# Patient Record
Sex: FEMALE | Race: BLACK OR AFRICAN AMERICAN | Employment: FULL TIME | URBAN - METROPOLITAN AREA
[De-identification: names, ages, dates, MRNs, and addresses within clinical notes are randomized per-mention and may not be internally consistent; named-entity substitution may affect disease eponyms.]

---

## 2017-04-13 RX ORDER — METFORMIN HYDROCHLORIDE 1000 MG/1
1000 TABLET ORAL 2 TIMES DAILY
Qty: 180 TAB | Refills: 1 | Status: SHIPPED | OUTPATIENT
Start: 2017-04-13 | End: 2017-05-24 | Stop reason: SDUPTHER

## 2017-04-13 RX ORDER — GLIPIZIDE 10 MG/1
10 TABLET ORAL 2 TIMES DAILY
Qty: 180 TAB | Refills: 1 | Status: SHIPPED | OUTPATIENT
Start: 2017-04-13 | End: 2017-05-24 | Stop reason: SDUPTHER

## 2017-05-25 RX ORDER — METFORMIN HYDROCHLORIDE 1000 MG/1
1000 TABLET ORAL 2 TIMES DAILY
Qty: 180 TAB | Refills: 1 | Status: SHIPPED | OUTPATIENT
Start: 2017-05-25 | End: 2017-10-21 | Stop reason: SDUPTHER

## 2017-05-25 RX ORDER — GLIPIZIDE 10 MG/1
10 TABLET ORAL 2 TIMES DAILY
Qty: 180 TAB | Refills: 1 | Status: SHIPPED | OUTPATIENT
Start: 2017-05-25 | End: 2018-04-18

## 2017-06-05 ENCOUNTER — TELEPHONE (OUTPATIENT)
Dept: INTERNAL MEDICINE CLINIC | Age: 57
End: 2017-06-05

## 2017-06-05 NOTE — TELEPHONE ENCOUNTER
Pt is requesting a call back regarding a voicemail received today. Pt best contact [phone].        Message received & copied from [facility]

## 2017-06-06 ENCOUNTER — OFFICE VISIT (OUTPATIENT)
Dept: INTERNAL MEDICINE CLINIC | Age: 57
End: 2017-06-06

## 2017-06-06 VITALS
WEIGHT: 176 LBS | SYSTOLIC BLOOD PRESSURE: 127 MMHG | DIASTOLIC BLOOD PRESSURE: 80 MMHG | HEIGHT: 63 IN | TEMPERATURE: 98.1 F | HEART RATE: 71 BPM | OXYGEN SATURATION: 100 % | BODY MASS INDEX: 31.18 KG/M2 | RESPIRATION RATE: 18 BRPM

## 2017-06-06 DIAGNOSIS — R35.0 URINARY FREQUENCY: ICD-10-CM

## 2017-06-06 LAB
BILIRUB UR QL STRIP: NEGATIVE
GLUCOSE UR-MCNC: NORMAL MG/DL
KETONES P FAST UR STRIP-MCNC: NORMAL MG/DL
PH UR STRIP: 5 [PH] (ref 4.6–8)
PROT UR QL STRIP: NEGATIVE MG/DL
SP GR UR STRIP: 1.02 (ref 1–1.03)
UA UROBILINOGEN AMB POC: NORMAL (ref 0.2–1)
URINALYSIS CLARITY POC: CLEAR
URINALYSIS COLOR POC: NORMAL
URINE BLOOD POC: NEGATIVE
URINE LEUKOCYTES POC: NEGATIVE
URINE NITRITES POC: NEGATIVE

## 2017-06-06 RX ORDER — INSULIN GLARGINE 100 [IU]/ML
INJECTION, SOLUTION SUBCUTANEOUS
Qty: 5 PEN | Refills: 3 | Status: SHIPPED | OUTPATIENT
Start: 2017-06-06 | End: 2017-06-06 | Stop reason: SDUPTHER

## 2017-06-06 NOTE — TELEPHONE ENCOUNTER
Reviewed injection sites with patient while she was in the office. Patient was going to  Insulin pens from the pharmacy and return to the office for instructions on how to give insulin injection. Spoke with patient on the phone. Identified patient 2 identifiers verified. Patient reports prescription was not ready. Patient encouraged to call office or return to office at her convenience if she needed assistance. Dr. Strauss Patch aware.

## 2017-06-06 NOTE — PROGRESS NOTES
SUBJECTIVE:   Ms. Neri Adrian is a 55 y.o. female who is here for f/u of DM. Pt states her blood sugar was 447 yesterday. She claims her blood sugar has been elevated. Pt denies steroid use. Pt reports going to they gym regularly. Pt endorses taking DM medications faithfully. Pt denies known skin lesions. Pt reports her friend told her to ask her doctor about Victoza. Pt notes she cannot do vials and needles, but can do the pin needles for insulin use. Pt reports increased fatigue with elevated blood glucose. Pt states she started a fast where she is not eating meat. Pt reports eating vegetables and rice. Pt states she ate rice Sunday. Pt states she eats bread if she has a sandwich and occasional baked potatoes. Pt reports mostly drinking mostly water and some diet soda. Pt claims she has not noticed changes in vision despite she blood sugar being elevated. Pt reports episodes of increased urination when her blood sugar was 200s. Pt notes this sensation stopped aftert drinking Cranberry juice. Pt reports her urine has changed from yellow to clear. Pt reports calling the office yesterday and was told my nurse would give her a call back. Pt denies having insurance currently. Pt claims she is applying for the Care card, but has not heard back from anyone. Pt notes she uses [facility] pharmacy since [facility]. Pt claims she is waiting for [facility] to open. Pt denies changes in BMs. At this time, she is otherwise doing well and has brought no other complaints to my attention today. For a list of the medical issues addressed today, see the assessment and plan below. PMH:   Past Medical History:   Diagnosis Date    Cutaneous wart left foot 8/2/2011    DM (diabetes mellitus) ([address] 12/10/2009    Hypercholesterolemia     Hyperlipidemia 12/10/2009     PSH:  has a past surgical history that includes gi and gyn. All: has No Known Allergies.    MEDS:   Current Outpatient Prescriptions Medication Sig    insulin glargine (BASAGLAR KWIKPEN) 100 unit/mL (3 mL) inpn Take 20 units subcutaneously every evening. Indications: type 2 diabetes mellitus    metFORMIN (GLUCOPHAGE) 1,000 mg tablet Take 1 Tab by mouth two (2) times a day.  glipiZIDE (GLUCOTROL) 10 mg tablet Take 1 Tab by mouth two (2) times a day.  glucose blood VI test strips (ACCU-CHEK LAURENT PLUS TEST STRP) strip Use as directed to test twice daily- DX-DM-E11.9    Blood-Glucose Meter (ACCU-CHEK LAURENT PLUS METER) misc Use as directed to test twice daily- DX-DM-E11.9    Lancets (ACCU-CHEK SOFTCLIX LANCETS) misc Use as directed to test twice daily- DX-DM-E11.9    nitrofurantoin, macrocrystal-monohydrate, (MACROBID) 100 mg capsule TAKE ONE CAPSULE BY MOUTH TWO TIMES A DAY    terconazole (TERAZOL 7) 0.4 % vaginal cream Insert 1 Applicator into vagina nightly.  LACTOBACILLUS ACIDOPHILUS (PROBIOTIC PO) Take  by mouth.  chlorpheniramine-HYDROcodone (TUSSIONEX) 10-8 mg/5 mL suspension Take 5 mL by mouth every twelve (12) hours as needed for Cough.  LORazepam (ATIVAN) 1 mg tablet Take 1 Tab by mouth every evening.  Blood-Glucose Meter monitoring kit Accu-chek Laurent Meter  Test 2-3 times daily    glucose blood VI test strips (ACCU-CHEK LAURENT) strip Test 2-3 times a day    Lancets Misc Check BG daily    glucose blood VI test strips (BLOOD GLUCOSE TEST) strip Check BG test strips    fluticasone (FLONASE) 50 mcg/Actuation nasal spray by Nasal route. Administer to right and left nostril. No current facility-administered medications for this visit. FH: family history includes Diabetes in her father; MS in her sister. There is no history of Cancer, Heart Disease, Hypertension, or Stroke. SH:  reports that she has quit smoking. She quit after 8.00 years of use. She has never used smokeless tobacco. She reports that she does not drink alcohol or use illicit drugs.      Review of Systems - History obtained from the patient  General ROS: negative  Psychological ROS: negative  Ophthalmic ROS: negative  ENT ROS: negative  Respiratory ROS: no cough, shortness of breath, or wheezing  Cardiovascular ROS: no chest pain or dyspnea on exertion  Gastrointestinal ROS: no abdominal pain, change in bowel habits, or black or bloody stools  Genito-Urinary ROS: negative  Musculoskeletal ROS: negative  Neurological ROS: negative  Dermatological ROS: negative    OBJECTIVE:   Vitals:   Visit Vitals    /80    Pulse 71    Temp 98.1 °F (36.7 °C) (Oral)    Resp 18    Ht 5' 3\" (1.6 m)    Wt 176 lb (79.8 kg)    SpO2 100%    BMI 31.18 kg/m2      Gen: Pleasant 55 y.o.  female in NAD. HEENT: NC/AT. HEART: RRR, No M/G/R. LUNGS: CTAB No W/R. EXTREMITIES: Warm. No C/C/E. NEURO: Alert and oriented x 3. Cranial nerves grossly intact. No focal sensory or motor deficits noted. SKIN: Warm. Dry. No rashes or other lesions noted. ASSESSMENT/ PLAN:   Lindsay Tyler was seen today for diabetes. Diagnoses and all orders for this visit:    Uncontrolled type 2 diabetes mellitus with complication, without long-term current use of insulin (HCC)  -     HEMOGLOBIN A1C WITH EAG  -     insulin glargine (BASAGLAR KWIKPEN) 100 unit/mL (3 mL) inpn; Take 20 units subcutaneously every evening. Indications: type 2 diabetes mellitus    Urinary frequency  -     AMB POC URINALYSIS DIP STICK AUTO W/ MICRO    Other orders  -     Cancel: METABOLIC PANEL, COMPREHENSIVE      Pt was instructed to call ad ask for Cathy when she calls this office again. We discussed that Victoza and insulin are different medications. Eventually diabetic patients have to use insulin. Pt is no longer responding to max dose of oral medications. I ordered an Hgb A1C lab for monitoring of DM. Pt may eat beans and vegetable noodles, but decrease simple carbohydrate intake. I called the pharmacist at [facility] to see the cost of different insulins without insurance.  I prescribed Elizabet Goetz 100 units/mL 20 units every evening for management of DM. She will keep a detailed record of her blood glucose levels. Pt was advised to apply for the [facility] access card so she can see other specialities not covered under the care card. I recommended the pt go to the Care Card office to see about where she is in the application process. Pt was given the forms to apply for the Care Card today since our record did not have her current application. I ordered a UA, that showed glucose and ketones, for investigation of increased urinary frequency. Pt will f/u in one month for DM. Follow-up Disposition:  Return in about 1 month (around 7/6/2017) for follow up diabetes. I have reviewed the patient's medications and risks/side effects/benefits were discussed. Diagnosis(-es) explained to patient and questions answered. Literature provided where appropriate.      Written by Jennifer Balderas, as dictated by Jackson Zafar MD.

## 2017-06-06 NOTE — PROGRESS NOTES
1. Have you been to the ER, urgent care clinic since your last visit? Hospitalized since your last visit? No    2. Have you seen or consulted any other health care providers outside of the [address] [place] [address] since your last visit? Include any pap smears or colon screening.  No     Last eye exam done in March 2017

## 2017-06-06 NOTE — MR AVS SNAPSHOT
Visit Information Date & Time Provider Department Dept. Phone Encounter #  
 6/6/2017 11:00 AM Faye De Oilveira, [address] [id number] Follow-up Instructions Return in about 1 month (around 7/6/2017) for follow up diabetes. Upcoming Health Maintenance Date Due Hepatitis C Screening 1960 Pneumococcal 19-64 Medium Risk (1 of 1 - PPSV23) 7/13/1980 DTaP/Tdap/Td series (1 - Tdap) 7/13/1982 FOBT Q 1 YEAR AGE 50-75 7/13/2011 FOOT EXAM Q1 8/13/2015 HEMOGLOBIN A1C Q6M 1/18/2017 EYE EXAM RETINAL OR DILATED Q1 2/9/2017 MICROALBUMIN Q1 7/18/2017 INFLUENZA AGE 9 TO ADULT 8/1/2017 LIPID PANEL Q1 8/25/2017 BREAST CANCER SCRN MAMMOGRAM 7/22/2018 PAP AKA CERVICAL CYTOLOGY 10/6/2019 Allergies as of 6/6/2017  Review Complete On: 6/6/2017 By: Faye De Oliveira MD  
 No Known Allergies Current Immunizations  Reviewed on 12/28/2010 No immunizations on file. Not reviewed this visit You Were Diagnosed With   
  
 Codes Comments Type 2 diabetes mellitus without complication, without long-term current use of insulin (HCC)    -  Primary ICD-10-CM: E11.9 ICD-9-CM: 250.00 Urinary frequency     ICD-10-CM: R35.0 ICD-9-CM: 788.41 Vitals BP Pulse Temp Resp Height(growth percentile) Weight(growth percentile) 127/80 71 98.1 °F (36.7 °C) (Oral) 18 5' 3\" (1.6 m) 176 lb (79.8 kg) SpO2 BMI OB Status Smoking Status 100% 31.18 kg/m2 Postmenopausal Former Smoker BMI and BSA Data Body Mass Index Body Surface Area  
 31.18 kg/m 2 1.88 m 2 Preferred Pharmacy Pharmacy Name Phone [id number] [address] [phone] Your Updated Medication List  
  
   
This list is accurate as of: 6/6/17 11:50 AM.  Always use your most recent med list.  
  
  
  
  
 * Blood-Glucose Meter monitoring kit Accu-chek Jacquie Meter Test 2-3 times daily * Blood-Glucose Meter Misc Commonly known as:  ACCU-CHEK LAURENT PLUS METER Use as directed to test twice daily- DX-DM-E11.9 chlorpheniramine-HYDROcodone 10-8 mg/5 mL suspension Commonly known as:  Shayla Muscat Take 5 mL by mouth every twelve (12) hours as needed for Cough. fluticasone 50 mcg/actuation nasal spray Commonly known as:  FLONASE  
by Nasal route. Administer to right and left nostril.  
  
 glipiZIDE 10 mg tablet Commonly known as:  Peter Sacramento Take 1 Tab by mouth two (2) times a day. * glucose blood VI test strips strip Commonly known as:  ACCU-CHEK LAURENT Test 2-3 times a day * glucose blood VI test strips strip Commonly known as:  blood glucose test  
Check BG test strips * glucose blood VI test strips strip Commonly known as:  ACCU-CHEK LAURENT PLUS TEST STRP Use as directed to test twice daily- DX-DM-E11.9 * Lancets Misc Check BG daily * Lancets Misc Commonly known as:  ACCU-CHEK SOFTCLIX LANCETS Use as directed to test twice daily- DX-DM-E11.9 LORazepam 1 mg tablet Commonly known as:  ATIVAN Take 1 Tab by mouth every evening. metFORMIN 1,000 mg tablet Commonly known as:  GLUCOPHAGE Take 1 Tab by mouth two (2) times a day. nitrofurantoin (macrocrystal-monohydrate) 100 mg capsule Commonly known as:  MACROBID  
TAKE ONE CAPSULE BY MOUTH TWO TIMES A DAY PROBIOTIC PO Take  by mouth. terconazole 0.4 % vaginal cream  
Commonly known as:  TERAZOL 7 Insert 1 Applicator into vagina nightly. * Notice: This list has 7 medication(s) that are the same as other medications prescribed for you. Read the directions carefully, and ask your doctor or other care provider to review them with you. We Performed the Following AMB POC URINALYSIS DIP STICK AUTO W/ MICRO [08312 CPT(R)] HEMOGLOBIN A1C WITH EAG [90307 CPT(R)] Follow-up Instructions Return in about 1 month (around 7/6/2017) for follow up diabetes. Introducing [facility] & HEALTH SERVICES! Dear Cedric Castle: Thank you for requesting a [email or web address] account. Our records indicate that you already have an active [email or web address] account. You can access your account anytime at [email or web address] Did you know that you can access your hospital and ER discharge instructions at any time in [email or web address]? You can also review all of your test results from your hospital stay or ER visit. Additional Information If you have questions, please visit the Frequently Asked Questions section of the [email or web address] website at [email or web address] Remember, [email or web address] is NOT to be used for urgent needs. For medical emergencies, dial 911. Now available from your iPhone and Android! Please provide this summary of care documentation to your next provider. Your primary care clinician is listed as Keisha Zhou. If you have any questions after today's visit, please call [phone].

## 2017-06-07 DIAGNOSIS — E11.9 TYPE 2 DIABETES MELLITUS WITHOUT COMPLICATION, UNSPECIFIED LONG TERM INSULIN USE STATUS: Primary | ICD-10-CM

## 2017-06-07 LAB
EST. AVERAGE GLUCOSE BLD GHB EST-MCNC: 246 MG/DL
HBA1C MFR BLD: 10.2 % (ref 4.8–5.6)

## 2017-06-07 RX ORDER — INSULIN GLARGINE 100 [IU]/ML
INJECTION, SOLUTION SUBCUTANEOUS
Qty: 5 PEN | Refills: 3 | Status: SHIPPED | OUTPATIENT
Start: 2017-06-07 | End: 2017-09-25 | Stop reason: SDUPTHER

## 2017-06-07 NOTE — TELEPHONE ENCOUNTER
From: Manuel Anglin  To: Joshua Krueger MD  Sent: 6/6/2017 10:37 PM EDT  Subject: Medication Renewal Request    Original authorizing provider: MD Manuel Orlando would like a refill of the following medications:  insulin glargine (BASAGLAR KWIKPEN) 100 unit/mL (3 mL) inpn Joshua Krueger MD]    Preferred pharmacy: [facility] [address] [place] [address] [id number] [facility] [address]    Comment:  I cannot use this medication because it did not include the needles to administer the meds. Please provide a prescription for the smallest needles please. ... Jennifer bowman.

## 2017-06-07 NOTE — TELEPHONE ENCOUNTER
Pt is requesting to speak to Dr. Freya Kenney, about needing a script for needles so she can take her Insulin, which she did not take yesterday because she did not have any needles. The pt would like the script to be sent to the [facility] in [place], pt stated she did not have the number however the pharmacy is in her file, once the script is called in or faxed, please contact the pt as soon as possible because she needs to take her insulin.  The pt best contact number is [phone]       Message received & copied from [facility]

## 2017-09-15 ENCOUNTER — TELEPHONE (OUTPATIENT)
Dept: INTERNAL MEDICINE CLINIC | Age: 57
End: 2017-09-15

## 2017-09-15 NOTE — TELEPHONE ENCOUNTER
Identified patient 2 identifiers verified.  Guillermo Reason too expensive needs something cheeper ordered

## 2017-09-15 NOTE — TELEPHONE ENCOUNTER
#[phone] pt states she received a call from the nurse to schedule an appt. I tried to give her 10-18-17 and she wants to know why she can't be seen until then if you called her?   Please call pt

## 2017-09-19 ENCOUNTER — PATIENT OUTREACH (OUTPATIENT)
Dept: INTERNAL MEDICINE CLINIC | Age: 57
End: 2017-09-19

## 2017-09-19 NOTE — PROGRESS NOTES
Martin Kilpatrick MD 13 hours ago (1:18 AM)              Yolande,  Do you know of any prescription assistance programs? This patient has no insurance and pays out of pocket.           Called pt today and explained the [employer] program. Pt would like the packet of paperwork mailed to her. Pt also inquired about a f/u appt with Dr. Mariah Hobbs, who does not have a routine opening until 10/16/17.  Message routed to Dr. Joel Trivedi LPN , Stacy Lynn asking her to discuss sooner appt with Dr. Mariah Hobbs.

## 2017-09-19 NOTE — TELEPHONE ENCOUNTER
Alex Alonzo you know of any prescription assistance programs? This patient has no insurance and pays out of pocket.

## 2017-09-19 NOTE — Clinical Note
Jacqueline, this pt needs an appt sooner than the first available with Dr. Charo Franklin which is octoer 16th. Please work out a sooner date with Dr. Charo Franklin and call pt to schedule. Thanks.

## 2017-09-22 DIAGNOSIS — E78.2 MIXED HYPERLIPIDEMIA: ICD-10-CM

## 2017-09-22 DIAGNOSIS — Z00.00 ROUTINE GENERAL MEDICAL EXAMINATION AT A HEALTH CARE FACILITY: ICD-10-CM

## 2017-09-22 DIAGNOSIS — E11.9 TYPE 2 DIABETES MELLITUS WITHOUT COMPLICATION, UNSPECIFIED LONG TERM INSULIN USE STATUS: Primary | ICD-10-CM

## 2017-09-22 NOTE — PROGRESS NOTES
Left message for Gali Abdalla, patient advocate for [employer], asking she call pt who was mailed the medication assistance application packet earlier in the week.

## 2017-09-23 LAB
ALBUMIN SERPL-MCNC: 4.7 G/DL (ref 3.5–5.5)
ALBUMIN/GLOB SERPL: 1.7 {RATIO} (ref 1.2–2.2)
ALP SERPL-CCNC: 110 IU/L (ref 39–117)
ALT SERPL-CCNC: 26 IU/L (ref 0–32)
AST SERPL-CCNC: 17 IU/L (ref 0–40)
BILIRUB SERPL-MCNC: 0.3 MG/DL (ref 0–1.2)
BUN SERPL-MCNC: 9 MG/DL (ref 6–24)
BUN/CREAT SERPL: 13 (ref 9–23)
CALCIUM SERPL-MCNC: 9.5 MG/DL (ref 8.7–10.2)
CHLORIDE SERPL-SCNC: 99 MMOL/L (ref 96–106)
CHOLEST SERPL-MCNC: 191 MG/DL (ref 100–199)
CO2 SERPL-SCNC: 26 MMOL/L (ref 18–29)
CREAT SERPL-MCNC: 0.69 MG/DL (ref 0.57–1)
EST. AVERAGE GLUCOSE BLD GHB EST-MCNC: 194 MG/DL
GLOBULIN SER CALC-MCNC: 2.7 G/DL (ref 1.5–4.5)
GLUCOSE SERPL-MCNC: 87 MG/DL (ref 65–99)
HBA1C MFR BLD: 8.4 % (ref 4.8–5.6)
HDLC SERPL-MCNC: 49 MG/DL
LDLC SERPL CALC-MCNC: 108 MG/DL (ref 0–99)
POTASSIUM SERPL-SCNC: 4.1 MMOL/L (ref 3.5–5.2)
PROT SERPL-MCNC: 7.4 G/DL (ref 6–8.5)
SODIUM SERPL-SCNC: 141 MMOL/L (ref 134–144)
TRIGL SERPL-MCNC: 169 MG/DL (ref 0–149)
VLDLC SERPL CALC-MCNC: 34 MG/DL (ref 5–40)

## 2017-09-25 ENCOUNTER — OFFICE VISIT (OUTPATIENT)
Dept: INTERNAL MEDICINE CLINIC | Age: 57
End: 2017-09-25

## 2017-09-25 VITALS
TEMPERATURE: 97.9 F | OXYGEN SATURATION: 99 % | WEIGHT: 186 LBS | HEIGHT: 63 IN | RESPIRATION RATE: 18 BRPM | BODY MASS INDEX: 32.96 KG/M2 | SYSTOLIC BLOOD PRESSURE: 114 MMHG | HEART RATE: 82 BPM | DIASTOLIC BLOOD PRESSURE: 76 MMHG

## 2017-09-25 DIAGNOSIS — B37.9 YEAST INFECTION: ICD-10-CM

## 2017-09-25 RX ORDER — INSULIN GLARGINE 100 [IU]/ML
INJECTION, SOLUTION SUBCUTANEOUS
Qty: 5 PEN | Refills: 3 | Status: SHIPPED | OUTPATIENT
Start: 2017-09-25 | End: 2017-09-25 | Stop reason: SDUPTHER

## 2017-09-25 RX ORDER — INSULIN GLARGINE 100 [IU]/ML
INJECTION, SOLUTION SUBCUTANEOUS
Qty: 1 PEN | Refills: 0 | Status: SHIPPED | COMMUNITY
Start: 2017-09-25

## 2017-09-25 RX ORDER — FLUCONAZOLE 150 MG/1
150 TABLET ORAL DAILY
Qty: 2 TAB | Refills: 2 | Status: SHIPPED | OUTPATIENT
Start: 2017-09-25 | End: 2017-09-26

## 2017-09-25 RX ORDER — INSULIN GLARGINE 100 [IU]/ML
INJECTION, SOLUTION SUBCUTANEOUS
Qty: 5 PEN | Refills: 3 | Status: SHIPPED | OUTPATIENT
Start: 2017-09-25 | End: 2019-01-29

## 2017-09-25 NOTE — MR AVS SNAPSHOT
Visit Information Date & Time Provider Department Dept. Phone Encounter #  
 9/25/2017  3:30 PM Evin Aguila, [address] [phone] [id number] Follow-up Instructions Return in about 3 months (around 12/25/2017) for follow up diabetes. Upcoming Health Maintenance Date Due Hepatitis C Screening 1960 Pneumococcal 19-64 Medium Risk (1 of 1 - PPSV23) 7/13/1980 DTaP/Tdap/Td series (1 - Tdap) 7/13/1982 FOBT Q 1 YEAR AGE 50-75 7/13/2011 FOOT EXAM Q1 8/13/2015 EYE EXAM RETINAL OR DILATED Q1 2/9/2017 MICROALBUMIN Q1 7/18/2017 INFLUENZA AGE 9 TO ADULT 8/1/2017 HEMOGLOBIN A1C Q6M 3/22/2018 BREAST CANCER SCRN MAMMOGRAM 7/22/2018 LIPID PANEL Q1 9/22/2018 PAP AKA CERVICAL CYTOLOGY 10/6/2019 Allergies as of 9/25/2017  Review Complete On: 9/25/2017 By: Faraz Villatoro LPN No Known Allergies Current Immunizations  Reviewed on 12/28/2010 No immunizations on file. Not reviewed this visit You Were Diagnosed With   
  
 Codes Comments Yeast infection     ICD-10-CM: B37.9 ICD-9-CM: 112.9 Uncontrolled type 2 diabetes mellitus with complication, without long-term current use of insulin (HCC)     ICD-10-CM: E11.8, E11.65 ICD-9-CM: 250.82 Vitals BP Pulse Temp Resp Height(growth percentile) Weight(growth percentile) 114/76 (BP 1 Location: Left arm, BP Patient Position: Sitting) 82 97.9 °F (36.6 °C) (Oral) 18 5' 3\" (1.6 m) 186 lb (84.4 kg) SpO2 BMI OB Status Smoking Status 99% 32.95 kg/m2 Postmenopausal Former Smoker BMI and BSA Data Body Mass Index Body Surface Area 32.95 kg/m 2 1.94 m 2 Preferred Pharmacy Pharmacy Name Phone [facility] [address], [facility] [address] [phone] Your Updated Medication List  
  
   
This list is accurate as of: 9/25/17  4:40 PM.  Always use your most recent med list.  
  
  
  
  
 * Blood-Glucose Meter monitoring kit Accu-chek Jacquie Meter Test 2-3 times daily * Blood-Glucose Meter Misc Commonly known as:  ACCU-CHEK JACQUIE PLUS METER Use as directed to test twice daily- DX-DM-E11.9 chlorpheniramine-HYDROcodone 10-8 mg/5 mL suspension Commonly known as:  Florestine Pleitez Take 5 mL by mouth every twelve (12) hours as needed for Cough. fluconazole 150 mg tablet Commonly known as:  DIFLUCAN Take 1 Tab by mouth daily for 1 day. FDA advises cautious prescribing of oral fluconazole in pregnancy. Indications: VULVOVAGINAL CANDIDIASIS  
  
 fluticasone 50 mcg/actuation nasal spray Commonly known as:  FLONASE  
by Nasal route. Administer to right and left nostril.  
  
 glipiZIDE 10 mg tablet Commonly known as:  Clerance Sharps Take 1 Tab by mouth two (2) times a day. * glucose blood VI test strips strip Commonly known as:  ACCU-CHEK JACQUIE Test 2-3 times a day * glucose blood VI test strips strip Commonly known as:  blood glucose test  
Check BG test strips * glucose blood VI test strips strip Commonly known as:  ACCU-CHEK JACQUIE PLUS TEST STRP Use as directed to test twice daily- DX-DM-E11.9  
  
 insulin glargine 100 unit/mL (3 mL) Inpn Commonly known asMikel Lourdes Take 20 units subcutaneously every evening. Indications: type 2 diabetes mellitus * Lancets Misc Check BG daily * Lancets Misc Commonly known as:  ACCU-CHEK SOFTCLIX LANCETS Use as directed to test twice daily- DX-DM-E11.9 LORazepam 1 mg tablet Commonly known as:  ATIVAN Take 1 Tab by mouth every evening. metFORMIN 1,000 mg tablet Commonly known as:  GLUCOPHAGE Take 1 Tab by mouth two (2) times a day. nitrofurantoin (macrocrystal-monohydrate) 100 mg capsule Commonly known as:  MACROBID  
TAKE ONE CAPSULE BY MOUTH TWO TIMES A DAY PROBIOTIC PO Take  by mouth.   
  
 terconazole 0.4 % vaginal cream  
 Commonly known as:  TERAZOL 7 Insert 1 Applicator into vagina nightly. * Notice: This list has 7 medication(s) that are the same as other medications prescribed for you. Read the directions carefully, and ask your doctor or other care provider to review them with you. Prescriptions Printed Refills  
 fluconazole (DIFLUCAN) 150 mg tablet 2 Sig: Take 1 Tab by mouth daily for 1 day. FDA advises cautious prescribing of oral fluconazole in pregnancy. Indications: VULVOVAGINAL CANDIDIASIS Class: Print Route: Oral  
  
Prescriptions Sent to Pharmacy Refills  
 insulin glargine (BASAGLAR KWIKPEN) 100 unit/mL (3 mL) inpn 3 Sig: Take 20 units subcutaneously every evening. Indications: type 2 diabetes mellitus Class: Normal  
 Pharmacy: [address] Uribe, [address] [place] [address] #: [phone] We Performed the Following REFERRAL TO ENDOCRINOLOGY [XZQ50 Custom] Follow-up Instructions Return in about 3 months (around 12/25/2017) for follow up diabetes. Referral Information Referral ID Referred By Referred To  
  
 [id number] PHILLIP, [address] Mercy Rhodell, MD   
   [address] [place] [address] [address] [place] [address] Phone: [phone] Fax: [phone] Visits Status Start Date End Date 1 New Request 9/25/17 9/25/18 If your referral has a status of pending review or denied, additional information will be sent to support the outcome of this decision. Introducing [facility] & HEALTH SERVICES! Dear Dayana Ta: Thank you for requesting a [employer] account. Our records indicate that you already have an active [employer] account. You can access your account anytime at [email or web address] Did you know that you can access your hospital and ER discharge instructions at any time in [employer]? You can also review all of your test results from your hospital stay or ER visit. Additional Information If you have questions, please visit the Frequently Asked Questions section of the [employer] website at [email or web address] Remember, [email or web address] / Netcycler is NOT to be used for urgent needs. For medical emergencies, dial 911. Now available from your iPhone and Android! Please provide this summary of care documentation to your next provider. Your primary care clinician is listed as Guilherme Worrell. If you have any questions after today's visit, please call [phone].

## 2017-09-25 NOTE — PROGRESS NOTES
SUBJECTIVE:   Ms. Raúl Milner is a 65 y.o. female who is here for follow up of DM. Pt's A1c was 8.4 on 9/22/2017, which was decreased from 10.2 on 6/6/2017. Pt reports use of 30 units of Basaglar. Pt states Basaglar was $15 per box with coupons for the first 2 boxes, but now costs $200 per box. Pt's labs from 9/22 were reviewed today. Pt's lipid panel showed elevated triglycerides at 169 and elevated LDL at 108. Pt's CMP was normal.     Pt c/o muscular soreness of her chest after leaning over sitting on the side of her bed for extended periods of time. Pt endorses wearing a bra. Pt reports occasional BLE swelling. At this time, she is otherwise doing well and has brought no other complaints to my attention today. For a list of the medical issues addressed today, see the assessment and plan below. PMH:   Past Medical History:   Diagnosis Date    Cutaneous wart left foot 8/2/2011    DM (diabetes mellitus) ([facility] [address] 12/10/2009    Hypercholesterolemia     Hyperlipidemia 12/10/2009     PSH:  has a past surgical history that includes gi and gyn. All: has No Known Allergies. MEDS:   Current Outpatient Prescriptions   Medication Sig    fluconazole (DIFLUCAN) 150 mg tablet Take 1 Tab by mouth daily for 1 day. FDA advises cautious prescribing of oral fluconazole in pregnancy. Indications: VULVOVAGINAL CANDIDIASIS    insulin glargine (BASAGLAR KWIKPEN) 100 unit/mL (3 mL) inpn Take 20 units subcutaneously every evening. Indications: type 2 diabetes mellitus    insulin glargine (LANTUS,BASAGLAR) 100 unit/mL (3 mL) inpn Take 30 units subcutaneously every evening.  metFORMIN (GLUCOPHAGE) 1,000 mg tablet Take 1 Tab by mouth two (2) times a day.  glipiZIDE (GLUCOTROL) 10 mg tablet Take 1 Tab by mouth two (2) times a day.     glucose blood VI test strips (ACCU-CHEK LAURENT PLUS TEST STRP) strip Use as directed to test twice daily- DX-DM-E11.9    Blood-Glucose Meter (ACCU-CHEK LAURENT PLUS METER) misc Use as directed to test twice daily- DX-DM-E11.9    Lancets (ACCU-CHEK SOFTCLIX LANCETS) misc Use as directed to test twice daily- DX-DM-E11.9    nitrofurantoin, macrocrystal-monohydrate, (MACROBID) 100 mg capsule TAKE ONE CAPSULE BY MOUTH TWO TIMES A DAY    terconazole (TERAZOL 7) 0.4 % vaginal cream Insert 1 Applicator into vagina nightly.  LACTOBACILLUS ACIDOPHILUS (PROBIOTIC PO) Take  by mouth.  chlorpheniramine-HYDROcodone (TUSSIONEX) 10-8 mg/5 mL suspension Take 5 mL by mouth every twelve (12) hours as needed for Cough.  LORazepam (ATIVAN) 1 mg tablet Take 1 Tab by mouth every evening.  Blood-Glucose Meter monitoring kit Accu-chek Jacquie Meter  Test 2-3 times daily    glucose blood VI test strips (ACCU-CHEK JACQUIE) strip Test 2-3 times a day    Lancets Misc Check BG daily    glucose blood VI test strips (BLOOD GLUCOSE TEST) strip Check BG test strips    fluticasone (FLONASE) 50 mcg/Actuation nasal spray by Nasal route. Administer to right and left nostril. No current facility-administered medications for this visit. FH: family history includes Diabetes in her father; MS in her sister. There is no history of Cancer, Heart Disease, Hypertension, or Stroke. SH:  reports that she has quit smoking. She quit after 8.00 years of use. She has never used smokeless tobacco. She reports that she does not drink alcohol or use illicit drugs.      Review of Systems - History obtained from the patient  General ROS: +BLE swelling, otherwise negative  Psychological ROS: negative  Ophthalmic ROS: negative  ENT ROS: negative  Respiratory ROS: no cough, shortness of breath, or wheezing  Cardiovascular ROS: no chest pain or dyspnea on exertion  Gastrointestinal ROS: no abdominal pain, change in bowel habits, or black or bloody stools  Genito-Urinary ROS: negative  Musculoskeletal ROS: negative  Neurological ROS: negative  Dermatological ROS: negative    OBJECTIVE:   Vitals:   Visit Vitals    BP 114/76 (BP 1 Location: Left arm, BP Patient Position: Sitting)    Pulse 82    Temp 97.9 °F (36.6 °C) (Oral)    Resp 18    Ht 5' 3\" (1.6 m)    Wt 186 lb (84.4 kg)    SpO2 99%    BMI 32.95 kg/m2      Gen: Pleasant 65 y.o.  female in NAD. HEENT: NC/AT. HEART: RRR, No M/G/R. LUNGS: CTAB No W/R. EXTREMITIES: Warm. No C/C/E. NEURO: Alert and oriented x 3. Cranial nerves grossly intact. No focal sensory or motor deficits noted. SKIN: Warm. Dry. No rashes or other lesions noted. ASSESSMENT/ PLAN:     Diagnoses and all orders for this visit:    1. Yeast infection  -     fluconazole (DIFLUCAN) 150 mg tablet; Take 1 Tab by mouth daily for 1 day. FDA advises cautious prescribing of oral fluconazole in pregnancy. Indications: VULVOVAGINAL CANDIDIASIS    2. Uncontrolled type 2 diabetes mellitus with complication, without long-term current use of insulin (HCC)  -     insulin glargine (BASAGLAR KWIKPEN) 100 unit/mL (3 mL) inpn; Take 20 units subcutaneously every evening. Indications: type 2 diabetes mellitus  -     REFERRAL TO ENDOCRINOLOGY    Other orders  -     insulin glargine (LANTUS,BASAGLAR) 100 unit/mL (3 mL) inpn; Take 30 units subcutaneously every evening. I prescribed Diflucan 150mg 1 tablet x 1 day for management of yeast infection. All lab results were reviewed and there has been improvement in the A1c. I refilled Lamar Posadas and pt was referred to Dr. Bonnie Murray (DM) for continued management of DM. Pt was given a Basaglar savings card and a sample of Lantus Basaglar today. Pt was advised to reduce portion sizes, limit simple carbohydrate intake, and exercise regularly. Pt was advised that she may use light compression hose for management of BLE swelling. Pt will f/u in 3 months for DM. Follow-up Disposition:  Return in about 3 months (around 12/25/2017) for follow up diabetes. I have reviewed the patient's medications and risks/side effects/benefits were discussed. Diagnosis(-es) explained to patient and questions answered. Literature provided where appropriate.      Written by Alison Zuniga, as dictated by Jose Manuel Skinner MD.

## 2017-10-08 NOTE — PROGRESS NOTES
CMP-Normal electrolyte levels, renal, and liver function. Lipids-Your current lab results reveal a elevated triglyceride, and ldl. Your total cholesterol should be under 200, triglycerides under 150, and your ldl under 100. Work on following a low fat diet and exercise at least three times a week. A1c-Your current hgbA1c is lower than your last level of 10.2  Keep up the good work! Continue to work on following a diabetic diet and exercise. Recheck this test: hgbA1c  in  3 months. Continue with current  medications.

## 2017-10-20 ENCOUNTER — TELEPHONE (OUTPATIENT)
Dept: INTERNAL MEDICINE CLINIC | Age: 57
End: 2017-10-20

## 2017-10-20 DIAGNOSIS — J20.9 ACUTE BRONCHITIS, UNSPECIFIED ORGANISM: ICD-10-CM

## 2017-10-20 RX ORDER — HYDROCODONE POLISTIREX AND CHLORPHENIRAMINE POLISTIREX 10; 8 MG/5ML; MG/5ML
5 SUSPENSION, EXTENDED RELEASE ORAL
Qty: 115 ML | Refills: 0 | Status: SHIPPED | OUTPATIENT
Start: 2017-10-20

## 2017-10-20 RX ORDER — AZITHROMYCIN 250 MG/1
TABLET, FILM COATED ORAL SEE ADMIN INSTRUCTIONS
Qty: 6 TAB | Refills: 0 | Status: CANCELLED | OUTPATIENT
Start: 2017-10-20 | End: 2017-10-25

## 2017-10-20 NOTE — TELEPHONE ENCOUNTER
Identified patient 2 identifiers verified. Patient  Reports coughing up brown secretions , no temperature, chest hurts when she coughs requesting Tussinex patient reports this happens every year.    Last office visit  9/25/17

## 2017-10-22 RX ORDER — METFORMIN HYDROCHLORIDE 1000 MG/1
TABLET ORAL
Qty: 180 TAB | Refills: 1 | Status: SHIPPED | OUTPATIENT
Start: 2017-10-22 | End: 2018-06-06 | Stop reason: SDUPTHER

## 2017-10-23 ENCOUNTER — OFFICE VISIT (OUTPATIENT)
Dept: INTERNAL MEDICINE CLINIC | Age: 57
End: 2017-10-23

## 2017-10-23 VITALS
HEIGHT: 63 IN | WEIGHT: 182.4 LBS | SYSTOLIC BLOOD PRESSURE: 139 MMHG | RESPIRATION RATE: 18 BRPM | DIASTOLIC BLOOD PRESSURE: 80 MMHG | HEART RATE: 82 BPM | BODY MASS INDEX: 32.32 KG/M2 | TEMPERATURE: 98.1 F | OXYGEN SATURATION: 99 %

## 2017-10-23 DIAGNOSIS — J20.9 ACUTE BRONCHITIS, UNSPECIFIED ORGANISM: Primary | ICD-10-CM

## 2017-10-23 RX ORDER — AZITHROMYCIN 250 MG/1
250 TABLET, FILM COATED ORAL SEE ADMIN INSTRUCTIONS
Qty: 6 TAB | Refills: 0 | Status: SHIPPED | OUTPATIENT
Start: 2017-10-23 | End: 2019-01-29

## 2017-10-23 NOTE — PATIENT INSTRUCTIONS
Bronchitis: Care Instructions  Your Care Instructions    Bronchitis is inflammation of the bronchial tubes, which carry air to the lungs. The tubes swell and produce mucus, or phlegm. The mucus and inflamed bronchial tubes make you cough. You may have trouble breathing. Most cases of bronchitis are caused by viruses like those that cause colds. Antibiotics usually do not help and they may be harmful. Bronchitis usually develops rapidly and lasts about 2 to 3 weeks in otherwise healthy people. Follow-up care is a key part of your treatment and safety. Be sure to make and go to all appointments, and call your doctor if you are having problems. It's also a good idea to know your test results and keep a list of the medicines you take. How can you care for yourself at home? · Take all medicines exactly as prescribed. Call your doctor if you think you are having a problem with your medicine. · Get some extra rest.  · Take an over-the-counter pain medicine, such as acetaminophen (Tylenol), ibuprofen (Advil, Motrin), or naproxen (Aleve) to reduce fever and relieve body aches. Read and follow all instructions on the label. · Do not take two or more pain medicines at the same time unless the doctor told you to. Many pain medicines have acetaminophen, which is Tylenol. Too much acetaminophen (Tylenol) can be harmful. · Take an over-the-counter cough medicine that contains dextromethorphan to help quiet a dry, hacking cough so that you can sleep. Avoid cough medicines that have more than one active ingredient. Read and follow all instructions on the label. · Breathe moist air from a humidifier, hot shower, or sink filled with hot water. The heat and moisture will thin mucus so you can cough it out. · Do not smoke. Smoking can make bronchitis worse. If you need help quitting, talk to your doctor about stop-smoking programs and medicines. These can increase your chances of quitting for good.   When should you call for help? Call 911 anytime you think you may need emergency care. For example, call if:  · You have severe trouble breathing. Call your doctor now or seek immediate medical care if:  · You have new or worse trouble breathing. · You cough up dark brown or bloody mucus (sputum). · You have a new or higher fever. · You have a new rash. Watch closely for changes in your health, and be sure to contact your doctor if:  · You cough more deeply or more often, especially if you notice more mucus or a change in the color of your mucus. · You are not getting better as expected. Where can you learn more? Go to http://florence-tashi.info/. Enter H333 in the search box to learn more about \"Bronchitis: Care Instructions. \"  Current as of: March 25, 2017  Content Version: 11.3  © 6495-7457 [employer]. Care instructions adapted under license by [employer] (which disclaims liability or warranty for this information). If you have questions about a medical condition or this instruction, always ask your healthcare professional. [address] any warranty or liability for your use of this information.

## 2017-10-23 NOTE — PROGRESS NOTES
Chief Complaint   Patient presents with    Cough     1. Have you been to the ER, urgent care clinic since your last visit? Hospitalized since your last visit? No    2. Have you seen or consulted any other health care providers outside of the [address] [place] [address] since your last visit? Include any pap smears or colon screening.  No

## 2017-10-23 NOTE — MR AVS SNAPSHOT
Visit Information Date & Time Provider Department Dept. Phone Encounter #  
 10/23/2017  2:15 PM Jose Franklin [facility] [address] [phone] [id number] Follow-up Instructions Return if symptoms worsen or fail to improve. Your Appointments 12/27/2017  2:45 PM  
ROUTINE CARE with MD YUNIOR Godoy [facility]) Appt Note: 3 month follow up  
 [facility] [address] [phone]  
[address] [facility] [address] [place] [address] Upcoming Health Maintenance Date Due Hepatitis C Screening 1960 Pneumococcal 19-64 Medium Risk (1 of 1 - PPSV23) 7/13/1980 DTaP/Tdap/Td series (1 - Tdap) 7/13/1982 FOBT Q 1 YEAR AGE 50-75 7/13/2011 FOOT EXAM Q1 8/13/2015 EYE EXAM RETINAL OR DILATED Q1 2/9/2017 MICROALBUMIN Q1 7/18/2017 INFLUENZA AGE 9 TO ADULT 8/1/2017 HEMOGLOBIN A1C Q6M 3/22/2018 BREAST CANCER SCRN MAMMOGRAM 7/22/2018 LIPID PANEL Q1 9/22/2018 PAP AKA CERVICAL CYTOLOGY 10/6/2019 Allergies as of 10/23/2017  Review Complete On: 10/23/2017 By: Jesse Knott No Known Allergies Current Immunizations  Reviewed on 12/28/2010 No immunizations on file. Not reviewed this visit You Were Diagnosed With   
  
 Codes Comments Acute bronchitis, unspecified organism    -  Primary ICD-10-CM: J20.9 ICD-9-CM: 466.0 Vitals BP Pulse Temp Resp Height(growth percentile) Weight(growth percentile) 139/80 (BP 1 Location: Right arm, BP Patient Position: Sitting) 82 98.1 °F (36.7 °C) (Oral) 18 5' 3\" (1.6 m) 182 lb 6.4 oz (82.7 kg) SpO2 BMI OB Status Smoking Status 99% 32.31 kg/m2 Postmenopausal Former Smoker BMI and BSA Data Body Mass Index Body Surface Area  
 32.31 kg/m 2 1.92 m 2 Preferred Pharmacy Pharmacy Name Phone  [id number] - [address] [phone] Your Updated Medication List  
  
   
This list is accurate as of: 10/23/17  2:30 PM.  Always use your most recent med list.  
  
  
  
  
 azithromycin 250 mg tablet Commonly known as:  [place] Sleight Take 1 Tab by mouth See Admin Instructions for 6 doses. Take 2 tabs on day one followed by 1 tab daily for a total of 5 days. * Blood-Glucose Meter monitoring kit Accu-chek Jacquie Meter Test 2-3 times daily * Blood-Glucose Meter Misc Commonly known as:  ACCU-CHEK JACQUIE PLUS METER Use as directed to test twice daily- DX-DM-E11.9 chlorpheniramine-HYDROcodone 10-8 mg/5 mL suspension Commonly known as:  Wynema Camara Take 5 mL by mouth every twelve (12) hours as needed for Cough. fluticasone 50 mcg/actuation nasal spray Commonly known as:  FLONASE  
by Nasal route. Administer to right and left nostril.  
  
 glipiZIDE 10 mg tablet Commonly known as:  Olevia Handy Take 1 Tab by mouth two (2) times a day. * glucose blood VI test strips strip Commonly known as:  ACCU-CHEK JACQUIE Test 2-3 times a day * glucose blood VI test strips strip Commonly known as:  blood glucose test  
Check BG test strips * glucose blood VI test strips strip Commonly known as:  ACCU-CHEK JACQUIE PLUS TEST STRP Use as directed to test twice daily- DX-DM-E11.9  
  
 * insulin glargine 100 unit/mL (3 mL) Inpn Commonly known as:  Rickey Parsons Take 30 units subcutaneously every evening. * insulin glargine 100 unit/mL (3 mL) Inpn Commonly known asDasha Selby Take 30 units subcutaneously every evening. Indications: type 2 diabetes mellitus * Lancets Misc Check BG daily * Lancets Misc Commonly known as:  ACCU-CHEK SOFTCLIX LANCETS Use as directed to test twice daily- DX-DM-E11.9 LORazepam 1 mg tablet Commonly known as:  ATIVAN Take 1 Tab by mouth every evening. metFORMIN 1,000 mg tablet Commonly known as:  GLUCOPHAGE  
 TAKE 1 TABLET BY MOUTH TWICE A DAY  
  
 nitrofurantoin (macrocrystal-monohydrate) 100 mg capsule Commonly known as:  MACROBID  
TAKE ONE CAPSULE BY MOUTH TWO TIMES A DAY PROBIOTIC PO Take  by mouth. terconazole 0.4 % vaginal cream  
Commonly known as:  TERAZOL 7 Insert 1 Applicator into vagina nightly. * Notice: This list has 9 medication(s) that are the same as other medications prescribed for you. Read the directions carefully, and ask your doctor or other care provider to review them with you. Prescriptions Sent to Pharmacy Refills  
 azithromycin (ZITHROMAX) 250 mg tablet 0 Sig: Take 1 Tab by mouth See Admin Instructions for 6 doses. Take 2 tabs on day one followed by 1 tab daily for a total of 5 days. Class: Normal  
 Pharmacy: [id number] [place] [address] #: [phone] Route: Oral  
  
Follow-up Instructions Return if symptoms worsen or fail to improve. Patient Instructions Bronchitis: Care Instructions Your Care Instructions Bronchitis is inflammation of the bronchial tubes, which carry air to the lungs. The tubes swell and produce mucus, or phlegm. The mucus and inflamed bronchial tubes make you cough. You may have trouble breathing. Most cases of bronchitis are caused by viruses like those that cause colds. Antibiotics usually do not help and they may be harmful. Bronchitis usually develops rapidly and lasts about 2 to 3 weeks in otherwise healthy people. Follow-up care is a key part of your treatment and safety. Be sure to make and go to all appointments, and call your doctor if you are having problems. It's also a good idea to know your test results and keep a list of the medicines you take. How can you care for yourself at home? · Take all medicines exactly as prescribed. Call your doctor if you think you are having a problem with your medicine.  
· Get some extra rest. 
 · Take an over-the-counter pain medicine, such as acetaminophen (Tylenol), ibuprofen (Advil, Motrin), or naproxen (Aleve) to reduce fever and relieve body aches. Read and follow all instructions on the label. · Do not take two or more pain medicines at the same time unless the doctor told you to. Many pain medicines have acetaminophen, which is Tylenol. Too much acetaminophen (Tylenol) can be harmful. · Take an over-the-counter cough medicine that contains dextromethorphan to help quiet a dry, hacking cough so that you can sleep. Avoid cough medicines that have more than one active ingredient. Read and follow all instructions on the label. · Breathe moist air from a humidifier, hot shower, or sink filled with hot water. The heat and moisture will thin mucus so you can cough it out. · Do not smoke. Smoking can make bronchitis worse. If you need help quitting, talk to your doctor about stop-smoking programs and medicines. These can increase your chances of quitting for good. When should you call for help? Call 911 anytime you think you may need emergency care. For example, call if: 
· You have severe trouble breathing. Call your doctor now or seek immediate medical care if: 
· You have new or worse trouble breathing. · You cough up dark brown or bloody mucus (sputum). · You have a new or higher fever. · You have a new rash. Watch closely for changes in your health, and be sure to contact your doctor if: 
· You cough more deeply or more often, especially if you notice more mucus or a change in the color of your mucus. · You are not getting better as expected. Where can you learn more? Go to http://florence-tashi.info/. Enter H333 in the search box to learn more about \"Bronchitis: Care Instructions. \" Current as of: March 25, 2017 Content Version: 11.3 © 9905-1876 [employer].  Care instructions adapted under license by [address] (which disclaims liability or warranty for this information). If you have questions about a medical condition or this instruction, always ask your healthcare professional. [address] any warranty or liability for your use of this information. Introducing [address]! Dear Jo Arias: Thank you for requesting a [employer] account. Our records indicate that you already have an active [employer] account. You can access your account anytime at [email or web address] Did you know that you can access your hospital and ER discharge instructions at any time in [employer]? You can also review all of your test results from your hospital stay or ER visit. Additional Information If you have questions, please visit the Frequently Asked Questions section of the [employer] website at [email or web address] Remember, [employer] is NOT to be used for urgent needs. For medical emergencies, dial 911. Now available from your iPhone and Android! Please provide this summary of care documentation to your next provider. Your primary care clinician is listed as Senait Tellez. If you have any questions after today's visit, please call [phone].

## 2017-10-23 NOTE — PROGRESS NOTES
CC:   Chief Complaint   Patient presents with    Cough         HPI:    Andreas Means is a 65 y.o. female who complains of URI symptoms for 7 days. The patient reports sore throat, nasal congestion, ear congestion and cough. Denies fever, chills, ear pain, sinus pain, she has discolored mucous. Has chest congestion. Called last Friday and prescribed cough medicine - symptoms persisted    Past Medical History:   Diagnosis Date    Cutaneous wart left foot 8/2/2011    DM (diabetes mellitus) ([facility] [address] 12/10/2009    Hypercholesterolemia     Hyperlipidemia 12/10/2009           Review of Systems    Constitutional: negative for fevers, chills, anorexia and weight loss  Eyes:   negative for visual disturbance and irritation  ENT:   negative for tinnitus, positive for sore throat and nasal congestion  Negative for ear pain   Respiratory:  Positive  for cough, negative for hemoptysis, dyspnea,wheezing  CV:   negative for chest pain, palpitations, lower extremity edema  GI:   negative for nausea, vomiting, diarrhea, abdominal pain,melena  Genitourinary: negative for frequency, dysuria and hematuria, vaginal discharge, lesions  Musculoskel: negative for myalgias, arthralgias, back pain, muscle weakness, joint pain  Neurological:  negative for headaches, dizziness, focal weakness, numbness  Psych:         : negative for depression, anxiety     Objective:     Visit Vitals    /80 (BP 1 Location: Right arm, BP Patient Position: Sitting)    Pulse 82    Temp 98.1 °F (36.7 °C) (Oral)    Resp 18    Ht 5' 3\" (1.6 m)    Wt 182 lb 6.4 oz (82.7 kg)    SpO2 99%    BMI 32.31 kg/m2     Gen: Mildly ill appearing female  HEENT:   PERRL,normal conjunctiva. External ear and canals normal, TMs no opacification or erythema,  Swollen nasal turbinates, no sinus pain on palpation,  OP no erythema, no exudates, MMM  Neck:  Supple. Thyroid normal size, nontender, without nodules.  No masses or LAD  Resp:  No wheezing, no rhonchi, no rales.  CV:  RRR, normal S1S2, no murmur. GI: soft, nontender, without masses. No hepatosplenomegaly. Extrem:  +2 pulses, no edema, warm distally  Skin:  No rash, no lesions, no ulcers. Skin warm, normal turgor, without induration or nodules. Lab Results   Component Value Date/Time    WBC 6.9 08/25/2016 03:57 PM    HGB 11.4 08/25/2016 03:57 PM    HCT 34.6 08/25/2016 03:57 PM    PLATELET 520 17/44/9912 03:57 PM    MCV 84 08/25/2016 03:57 PM     Lab Results   Component Value Date/Time    Sodium 141 09/22/2017 11:30 AM    Potassium 4.1 09/22/2017 11:30 AM    Chloride 99 09/22/2017 11:30 AM    CO2 26 09/22/2017 11:30 AM    Anion gap 8 06/03/2010 12:05 AM    Glucose 87 09/22/2017 11:30 AM    BUN 9 09/22/2017 11:30 AM    Creatinine 0.69 09/22/2017 11:30 AM    BUN/Creatinine ratio 13 09/22/2017 11:30 AM    GFR est  09/22/2017 11:30 AM    GFR est non-AA 98 09/22/2017 11:30 AM    Calcium 9.5 09/22/2017 11:30 AM         Assessment/Plan:     1. Acute bronchitis, unspecified organism: symptoms for almost 1 week   - azithromycin (ZITHROMAX) 250 mg tablet; Take 1 Tab by mouth See Admin Instructions for 6 doses. Take 2 tabs on day one followed by 1 tab daily for a total of 5 days. Dispense: 6 Tab; Refill: 0      Follow-up Disposition:  Return if symptoms worsen or fail to improve.     Taylor Reyes MD

## 2017-10-23 NOTE — TELEPHONE ENCOUNTER
Call completed to patient, two identifiers verified. Patient advised per Dr. Pancho Haynes that an appointment is required if she would like ABT. Patient offered and accepted an appointment for today (Monday, October 23, 2017 02:15 PM).

## 2018-04-18 ENCOUNTER — OFFICE VISIT (OUTPATIENT)
Dept: INTERNAL MEDICINE CLINIC | Age: 58
End: 2018-04-18

## 2018-04-18 VITALS
HEIGHT: 63 IN | HEART RATE: 72 BPM | TEMPERATURE: 99.2 F | RESPIRATION RATE: 18 BRPM | WEIGHT: 187 LBS | BODY MASS INDEX: 33.13 KG/M2 | SYSTOLIC BLOOD PRESSURE: 129 MMHG | DIASTOLIC BLOOD PRESSURE: 78 MMHG | OXYGEN SATURATION: 98 %

## 2018-04-18 DIAGNOSIS — E11.9 TYPE 2 DIABETES MELLITUS WITHOUT COMPLICATION, UNSPECIFIED WHETHER LONG TERM INSULIN USE (HCC): Primary | ICD-10-CM

## 2018-04-18 DIAGNOSIS — E78.2 MIXED HYPERLIPIDEMIA: ICD-10-CM

## 2018-04-18 NOTE — PROGRESS NOTES
SUBJECTIVE:   Ms. Kavitha Hill is a 65 y.o. female who is here for follow up of routine medical issues. Pt is not fasting in the office today. DM: Pt is compliant in taking metformin and basaglar. Patient denies fatigue, dizziness, polydipsia, polyuria, polyphagia, pancreatitis, increased sugar consumption, sore/bleeding gums, blurred vision, or cuts that will not heal. Last HgA1c was 8.4 on 9/22/2017. Pt reports she was getting regular exercise, but has not exercised for the last 3 weeks. HLD: Last lipid panel showed elevated triglycerides and elevated LDL, with all other lipids in the normal range. Pt reports she has stiffness/limit ROM in her left arm/shoudler. Pt is unsure about going to PT due to issues with insurance. PREVENTIVE:   Mammogram: current (at [facility])    At this time, she is otherwise doing well and has brought no other complaints to my attention today. For a list of the medical issues addressed today, see the assessment and plan below. PMH:   Past Medical History:   Diagnosis Date    Cutaneous wart left foot 8/2/2011    DM (diabetes mellitus) ([facility] [address] 12/10/2009    Hypercholesterolemia     Hyperlipidemia 12/10/2009     PSH:  has a past surgical history that includes hx gi and hx gyn. All: has No Known Allergies. MEDS:   Current Outpatient Prescriptions   Medication Sig    metFORMIN (GLUCOPHAGE) 1,000 mg tablet TAKE 1 TABLET BY MOUTH TWICE A DAY    insulin glargine (LANTUS,BASAGLAR) 100 unit/mL (3 mL) inpn Take 30 units subcutaneously every evening.  insulin glargine (BASAGLAR KWIKPEN) 100 unit/mL (3 mL) inpn Take 30 units subcutaneously every evening.   Indications: type 2 diabetes mellitus    glucose blood VI test strips (ACCU-CHEK LAURENT PLUS TEST STRP) strip Use as directed to test twice daily- DX-DM-E11.9    Blood-Glucose Meter (ACCU-CHEK LAURENT PLUS METER) misc Use as directed to test twice daily- DX-DM-E11.9    Lancets (ACCU-CHEK SOFTCLIX LANCETS) misc Use as directed to test twice daily- DX-DM-E11.9    nitrofurantoin, macrocrystal-monohydrate, (MACROBID) 100 mg capsule TAKE ONE CAPSULE BY MOUTH TWO TIMES A DAY    terconazole (TERAZOL 7) 0.4 % vaginal cream Insert 1 Applicator into vagina nightly.  LACTOBACILLUS ACIDOPHILUS (PROBIOTIC PO) Take  by mouth.  LORazepam (ATIVAN) 1 mg tablet Take 1 Tab by mouth every evening.  Blood-Glucose Meter monitoring kit Accu-chek Jacquie Meter  Test 2-3 times daily    glucose blood VI test strips (ACCU-CHEK JACQUIE) strip Test 2-3 times a day    Lancets Misc Check BG daily    glucose blood VI test strips (BLOOD GLUCOSE TEST) strip Check BG test strips    fluticasone (FLONASE) 50 mcg/Actuation nasal spray by Nasal route. Administer to right and left nostril.  azithromycin (ZITHROMAX) 250 mg tablet Take 1 Tab by mouth See Admin Instructions for 6 doses. Take 2 tabs on day one followed by 1 tab daily for a total of 5 days.  chlorpheniramine-HYDROcodone (TUSSIONEX) 10-8 mg/5 mL suspension Take 5 mL by mouth every twelve (12) hours as needed for Cough. No current facility-administered medications for this visit. FH: family history includes Diabetes in her father; MS in her sister. There is no history of Cancer, Heart Disease, Hypertension, or Stroke. SH:  reports that she has quit smoking. She quit after 8.00 years of use. She has never used smokeless tobacco. She reports that she does not drink alcohol or use illicit drugs.      Review of Systems - History obtained from the patient  General ROS: no fever, chills, fatigue, body aches  Psychological ROS: no change in anxiety, depression, SI/HI  Ophthalmic ROS: no blurred vision, myopia, double vision  ENT ROS: no dysphagia, otalgia, otorrhea, rhinorrhea, post nasal drip  Respiratory ROS: no cough, shortness of breath, or wheezing  Cardiovascular ROS: no chest pain or dyspnea on exertion  Gastrointestinal ROS: no abdominal pain, change in bowel habits, or black or bloody stools  Genito-Urinary ROS: no frequency, urgency, incontinence, dysuria, hematouria  Musculoskeletal ROS: stiffness in left shoulder   Neurological ROS: no headaches, dizziness, lightheadedness, tremors, seizures  Dermatological ROS: no rash or lesions    OBJECTIVE:   Vitals:   Visit Vitals    /78 (BP 1 Location: Left arm, BP Patient Position: Sitting)    Pulse 72    Temp 99.2 °F (37.3 °C) (Oral)    Resp 18    Ht 5' 3\" (1.6 m)    Wt 187 lb (84.8 kg)    SpO2 98%    BMI 33.13 kg/m2      Gen: Pleasant 65 y.o.  female in NAD. HEENT: PERRLA. EOMI. OP moist and pink. Neck: Supple. No LAD. HEART: RRR, No M/G/R.      LUNGS: CTAB No W/R. ABDOMEN: S, NT, ND, BS+. EXTREMITIES: Warm. No C/C/E.    MUSCULOSKELETAL: Normal ROM, muscle strength 5/5 all groups. NEURO: Alert and oriented x 3. Cranial nerves grossly intact. No focal sensory or motor deficits noted. SKIN: Warm. Dry. No rashes or other lesions noted. ASSESSMENT/ PLAN: Diagnoses and all orders for this visit:    1. Type 2 diabetes mellitus without complication, unspecified whether long term insulin use (HCC)  -     METABOLIC PANEL, COMPREHENSIVE  -     HEMOGLOBIN A1C WITH EAG    2. Mixed hyperlipidemia  -     METABOLIC PANEL, COMPREHENSIVE  -     LIPID PANEL        ICD-10-CM ICD-9-CM    1. Type 2 diabetes mellitus without complication, unspecified whether long term insulin use (HCC) W40.9 101.16 METABOLIC PANEL, COMPREHENSIVE      HEMOGLOBIN A1C WITH EAG   2. Mixed hyperlipidemia D51.6 457.6 METABOLIC PANEL, COMPREHENSIVE      LIPID PANEL     1. DM type 2  I advised them to continue current dose of metformin and insulin glargine, avoid sugars and starches, and to increase exercise when possible. Recheck hemoglobin A1c.      2. Hyperlipidemia   I recommended eating a low fat diet and increasing exercise. Recheck lipid panel and CMP. I advised pt to get a [employer].      Follow-up Disposition:  Return in about 3 months (around 7/18/2018) for follow up diabetes. I have reviewed the patient's medications and risks/side effects/benefits were discussed. Diagnosis(-es) explained to patient and questions answered. Literature provided where appropriate.      Written by Cornel Witt, as dictated by Angela Dexter MD.

## 2018-04-18 NOTE — PROGRESS NOTES
Chief Complaint   Patient presents with    Diabetes     followup     Reviewed record in preparation for visit and have obtained necessary documentation. Identified pt with two pt identifiers(name and ). Chief Complaint   Patient presents with    Diabetes     followup       There were no vitals filed for this visit. Coordination of Care Questionnaire:  :     1) Have you been to an emergency room, urgent care clinic since your last visit? no   Hospitalized since your last visit? no             2) Have you seen or consulted any other health care providers outside of [facility] since your last visit? no  (Include any pap smears or colon screenings in this section.)    3) In the event something were to happen to you and you were unable to speak on your behalf, do you have an Advance Directive/ Living Will in place stating your wishes? NO    Do you have an Advance Directive on file? no    4) Are you interested in receiving information on Advance Directives? NO    Patient is accompanied by self I have received verbal consent from Raúl Milner to discuss any/all medical information while they are present in the room.

## 2018-04-18 NOTE — MR AVS SNAPSHOT
[address] 
[phone] Patient: Michael Souza MRN:  [id number] Visit Information Date & Time Provider Department Dept. Phone Encounter #  
 4/18/2018 11:30 AM Maritn Kilpatrick, [address] [place] [address],[address] [phone] [id number] Follow-up Instructions Return in about 3 months (around 7/18/2018) for follow up diabetes. Upcoming Health Maintenance Date Due Hepatitis C Screening 1960 Pneumococcal 19-64 Medium Risk (1 of 1 - PPSV23) 7/13/1980 DTaP/Tdap/Td series (1 - Tdap) 7/13/1982 FOBT Q 1 YEAR AGE 50-75 7/13/2011 FOOT EXAM Q1 8/13/2015 EYE EXAM RETINAL OR DILATED Q1 2/9/2017 MICROALBUMIN Q1 7/18/2017 Influenza Age 5 to Adult 8/1/2017 HEMOGLOBIN A1C Q6M 3/22/2018 BREAST CANCER SCRN MAMMOGRAM 7/22/2018 LIPID PANEL Q1 9/22/2018 PAP AKA CERVICAL CYTOLOGY 10/6/2019 Allergies as of 4/18/2018  Review Complete On: 4/18/2018 By: Martin Kilpatrick MD  
 No Known Allergies Current Immunizations  Reviewed on 12/28/2010 No immunizations on file. Not reviewed this visit You Were Diagnosed With   
  
 Codes Comments Type 2 diabetes mellitus without complication, unspecified whether long term insulin use (HCC)    -  Primary ICD-10-CM: E11.9 ICD-9-CM: 250.00 Mixed hyperlipidemia     ICD-10-CM: E78.2 ICD-9-CM: 272.2 Vitals BP Pulse Temp Resp Height(growth percentile) Weight(growth percentile) 129/78 (BP 1 Location: Left arm, BP Patient Position: Sitting) 72 99.2 °F (37.3 °C) (Oral) 18 5' 3\" (1.6 m) 187 lb (84.8 kg) SpO2 BMI OB Status Smoking Status 98% 33.13 kg/m2 Postmenopausal Former Smoker BMI and BSA Data Body Mass Index Body Surface Area  
 33.13 kg/m 2 1.94 m 2 Preferred Pharmacy Pharmacy Name Phone  [address] [facility] [address] [facility] [phone] Your Updated Medication List  
  
   
This list is accurate as of 4/18/18 12:21 PM.  Always use your most recent med list.  
  
  
  
  
 azithromycin 250 mg tablet Commonly known as:  Alexus Thao Take 1 Tab by mouth See Admin Instructions for 6 doses. Take 2 tabs on day one followed by 1 tab daily for a total of 5 days. * Blood-Glucose Meter monitoring kit Accu-chek Jacquie Meter Test 2-3 times daily * Blood-Glucose Meter Misc Commonly known as:  ACCU-CHEK JACQUIE PLUS METER Use as directed to test twice daily- DX-DM-E11.9 chlorpheniramine-HYDROcodone 10-8 mg/5 mL suspension Commonly known as:  Trent Jas Take 5 mL by mouth every twelve (12) hours as needed for Cough. fluticasone 50 mcg/actuation nasal spray Commonly known as:  FLONASE  
by Nasal route. Administer to right and left nostril. * glucose blood VI test strips strip Commonly known as:  ACCU-CHEK JACQUIE Test 2-3 times a day * glucose blood VI test strips strip Commonly known as:  blood glucose test  
Check BG test strips * glucose blood VI test strips strip Commonly known as:  ACCU-CHEK JACQUIE PLUS TEST STRP Use as directed to test twice daily- DX-DM-E11.9  
  
 * insulin glargine 100 unit/mL (3 mL) Inpn Commonly known as:  Caye Huge Take 30 units subcutaneously every evening. * insulin glargine 100 unit/mL (3 mL) Inpn Commonly known as:  BASAGLAR KWIKPEN U-100 INSULIN Take 30 units subcutaneously every evening. Indications: type 2 diabetes mellitus * Lancets Misc Check BG daily * Lancets Misc Commonly known as:  ACCU-CHEK SOFTCLIX LANCETS Use as directed to test twice daily- DX-DM-E11.9 LORazepam 1 mg tablet Commonly known as:  ATIVAN Take 1 Tab by mouth every evening. metFORMIN 1,000 mg tablet Commonly known as:  GLUCOPHAGE  
TAKE 1 TABLET BY MOUTH TWICE A DAY  
  
 nitrofurantoin (macrocrystal-monohydrate) 100 mg capsule Commonly known as:  MACROBID  
TAKE ONE CAPSULE BY MOUTH TWO TIMES A DAY PROBIOTIC PO Take  by mouth. terconazole 0.4 % vaginal cream  
Commonly known as:  TERAZOL 7 Insert 1 Applicator into vagina nightly. * Notice: This list has 9 medication(s) that are the same as other medications prescribed for you. Read the directions carefully, and ask your doctor or other care provider to review them with you. We Performed the Following HEMOGLOBIN A1C WITH EAG [22956 CPT(R)] LIPID PANEL [76563 CPT(R)] METABOLIC PANEL, COMPREHENSIVE [61300 CPT(R)] Follow-up Instructions Return in about 3 months (around 7/18/2018) for follow up diabetes. Introducing [facility] SERVICES! Dear Ziggy Wolf: Thank you for requesting a [employer] account. Our records indicate that you already have an active [employer] account. You can access your account anytime at [email or web address] Did you know that you can access your hospital and ER discharge instructions at any time in [employer]? You can also review all of your test results from your hospital stay or ER visit. Additional Information If you have questions, please visit the Frequently Asked Questions section of the [employer] website at [email or web address] Remember, [employer] is NOT to be used for urgent needs. For medical emergencies, dial 911. Now available from your iPhone and Android! Please provide this summary of care documentation to your next provider. Your primary care clinician is listed as Wai Trotter. If you have any questions after today's visit, please call [phone].

## 2018-04-19 LAB
ALBUMIN SERPL-MCNC: 4.1 G/DL (ref 3.5–5.5)
ALBUMIN/GLOB SERPL: 1.6 {RATIO} (ref 1.2–2.2)
ALP SERPL-CCNC: 93 IU/L (ref 39–117)
ALT SERPL-CCNC: 25 IU/L (ref 0–32)
AST SERPL-CCNC: 16 IU/L (ref 0–40)
BILIRUB SERPL-MCNC: 0.3 MG/DL (ref 0–1.2)
BUN SERPL-MCNC: 11 MG/DL (ref 6–24)
BUN/CREAT SERPL: 15 (ref 9–23)
CALCIUM SERPL-MCNC: 9.4 MG/DL (ref 8.7–10.2)
CHLORIDE SERPL-SCNC: 98 MMOL/L (ref 96–106)
CHOLEST SERPL-MCNC: 161 MG/DL (ref 100–199)
CO2 SERPL-SCNC: 25 MMOL/L (ref 18–29)
CREAT SERPL-MCNC: 0.72 MG/DL (ref 0.57–1)
EST. AVERAGE GLUCOSE BLD GHB EST-MCNC: 171 MG/DL
GFR SERPLBLD CREATININE-BSD FMLA CKD-EPI: 108 ML/MIN/1.73
GFR SERPLBLD CREATININE-BSD FMLA CKD-EPI: 94 ML/MIN/1.73
GLOBULIN SER CALC-MCNC: 2.5 G/DL (ref 1.5–4.5)
GLUCOSE SERPL-MCNC: 138 MG/DL (ref 65–99)
HBA1C MFR BLD: 7.6 % (ref 4.8–5.6)
HDLC SERPL-MCNC: 45 MG/DL
LDLC SERPL CALC-MCNC: 93 MG/DL (ref 0–99)
POTASSIUM SERPL-SCNC: 3.9 MMOL/L (ref 3.5–5.2)
PROT SERPL-MCNC: 6.6 G/DL (ref 6–8.5)
SODIUM SERPL-SCNC: 140 MMOL/L (ref 134–144)
TRIGL SERPL-MCNC: 117 MG/DL (ref 0–149)
VLDLC SERPL CALC-MCNC: 23 MG/DL (ref 5–40)

## 2018-05-11 NOTE — PROGRESS NOTES
CMP-Normal electrolyte levels except for an elevation in the glucose level, normal renal and liver function  A1c-Your current hgbA1c is lower than your last level of 8.4. Keep up the good work! Continue to work on following a diabetic diet and exercise. Recheck this test: hgbA1c  in  3 months. Continue with current  medications.   Your lipid pans is normal.

## 2018-06-07 RX ORDER — METFORMIN HYDROCHLORIDE 1000 MG/1
TABLET ORAL
Qty: 180 TAB | Refills: 1 | Status: SHIPPED | OUTPATIENT
Start: 2018-06-07

## 2018-06-08 ENCOUNTER — PATIENT OUTREACH (OUTPATIENT)
Dept: INTERNAL MEDICINE CLINIC | Age: 58
End: 2018-06-08

## 2018-06-08 NOTE — PROGRESS NOTES
Per Dr. Sharita Zamudio ov note dated 4/18/18 pt is compliant with diabetes medications. Health promotion episode resolved.

## 2019-01-28 ENCOUNTER — TELEPHONE (OUTPATIENT)
Dept: INTERNAL MEDICINE CLINIC | Age: 59
End: 2019-01-28

## 2019-01-28 NOTE — TELEPHONE ENCOUNTER
Call completed to patient, two identifiers verified. Patient advised an appointment is needed for evaluation and additional refills.  Appointment scheduled for  Tuesday, January 29, 2019 08:45 AM

## 2019-01-28 NOTE — PROGRESS NOTES
HISTORY OF PRESENT ILLNESS Chucky Hartmann is a 63 y.o. female. HPI Hx IDDM , RLS 
PCP Dr Jodie Tan 
Here for acute care sick x 4 days with sinus congestion and dry cough Had temp 101 yesterday not normal temp today Some achiness Had famiy member with strep and flu recently Appetite ok Also requests refill of ativan she takes sparingly hs for RLS. Patient Active Problem List  
 Diagnosis Date Noted  Restless leg syndrome 07/11/2013  Left arm pain 06/01/2012  Cutaneous wart left foot 08/02/2011  Allergic rhinitis 10/08/2010  DM (diabetes mellitus) ([address] 12/10/2009  Hyperlipidemia 12/10/2009  Obesity 12/10/2009 Current Outpatient Medications Medication Sig Dispense Refill  metFORMIN (GLUCOPHAGE) 1,000 mg tablet TAKE ONE TABLET BY MOUTH TWICE A  Tab 1  
 azithromycin (ZITHROMAX) 250 mg tablet Take 1 Tab by mouth See Admin Instructions for 6 doses. Take 2 tabs on day one followed by 1 tab daily for a total of 5 days. 6 Tab 0  chlorpheniramine-HYDROcodone (TUSSIONEX) 10-8 mg/5 mL suspension Take 5 mL by mouth every twelve (12) hours as needed for Cough. 115 mL 0  
 insulin glargine (LANTUS,BASAGLAR) 100 unit/mL (3 mL) inpn Take 30 units subcutaneously every evening. 1 Pen 0  
 insulin glargine (BASAGLAR KWIKPEN) 100 unit/mL (3 mL) inpn Take 30 units subcutaneously every evening. Indications: type 2 diabetes mellitus 5 Pen 3  
 glucose blood VI test strips (ACCU-CHEK LAURENT PLUS TEST STRP) strip Use as directed to test twice daily- DX-DM-E11.9 180 Strip 3  Blood-Glucose Meter (ACCU-CHEK LAURENT PLUS METER) misc Use as directed to test twice daily- DX-DM-E11.9 1 Each 0  
 Lancets (ACCU-CHEK SOFTCLIX LANCETS) misc Use as directed to test twice daily- DX-DM-E11.9 180 Each 3  
 nitrofurantoin, macrocrystal-monohydrate, (MACROBID) 100 mg capsule TAKE ONE CAPSULE BY MOUTH TWO TIMES A DAY  0  
 terconazole (TERAZOL 7) 0.4 % vaginal cream Insert 1 Applicator into vagina nightly. 45 g 0  
 LACTOBACILLUS ACIDOPHILUS (PROBIOTIC PO) Take  by mouth.  LORazepam (ATIVAN) 1 mg tablet Take 1 Tab by mouth every evening. 30 Tab 3  Blood-Glucose Meter monitoring kit Accu-chek Jacquie Meter Test 2-3 times daily 1 Kit 0  
 glucose blood VI test strips (ACCU-CHEK JACQUIE) strip Test 2-3 times a day 1 Package 11  Lancets Misc Check BG daily 1 Package 11  
 glucose blood VI test strips (BLOOD GLUCOSE TEST) strip Check BG test strips 1 Package 11  
 fluticasone (FLONASE) 50 mcg/Actuation nasal spray by Nasal route. Administer to right and left nostril. 1 Bottle 5 No Known Allergies Lab Results Component Value Date/Time GFR est non-AA 94 04/18/2018 12:29 PM  
 GFR est  04/18/2018 12:29 PM  
 Creatinine 0.72 04/18/2018 12:29 PM  
 BUN 11 04/18/2018 12:29 PM  
 Sodium 140 04/18/2018 12:29 PM  
 Potassium 3.9 04/18/2018 12:29 PM  
 Chloride 98 04/18/2018 12:29 PM  
 CO2 25 04/18/2018 12:29 PM  
  
ROS Physical Exam  
Constitutional: She appears well-developed and well-nourished. Appears stated age HENT:  
Mouth/Throat: Oropharynx is clear and moist.  
TM's clear Cardiovascular: Normal rate, regular rhythm and normal heart sounds. Exam reveals no gallop and no friction rub. No murmur heard. Pulmonary/Chest: Effort normal and breath sounds normal. No respiratory distress. She has no wheezes. She has no rales. She exhibits no tenderness. Abdominal: Soft. Bowel sounds are normal.  
Musculoskeletal: She exhibits no edema. Lymphadenopathy:  
  She has no cervical adenopathy. Neurological: She is alert. Psychiatric: She has a normal mood and affect. Nursing note and vitals reviewed. ASSESSMENT and PLAN Diagnoses and all orders for this visit: 1. RLS (restless legs syndrome) -     LORazepam (ATIVAN) 1 mg tablet; Take 1 Tab by mouth nightly as needed for Anxiety. Max Daily Amount: 1 mg. 30/nr 2. Viral upper respiratory tract infection Continue otc decongestants for sinus congestion 
 zpak if not improved Other orders 
-     azithromycin (ZITHROMAX) 250 mg tablet; Take 1 Tab by mouth See Admin Instructions for 5 days. Follow-up Disposition: Not on File

## 2019-01-28 NOTE — TELEPHONE ENCOUNTER
Patient needs a prescription sent to the [facility] pharmacy for restless leg syndrome. Their number is [phone].        Message received & copied from [facility] after closing on 1/25/19

## 2019-01-29 ENCOUNTER — OFFICE VISIT (OUTPATIENT)
Dept: INTERNAL MEDICINE CLINIC | Age: 59
End: 2019-01-29

## 2019-01-29 VITALS
TEMPERATURE: 98.6 F | BODY MASS INDEX: 32.96 KG/M2 | HEIGHT: 63 IN | WEIGHT: 186 LBS | OXYGEN SATURATION: 98 % | HEART RATE: 84 BPM | SYSTOLIC BLOOD PRESSURE: 141 MMHG | DIASTOLIC BLOOD PRESSURE: 79 MMHG

## 2019-01-29 DIAGNOSIS — J06.9 VIRAL UPPER RESPIRATORY TRACT INFECTION: ICD-10-CM

## 2019-01-29 DIAGNOSIS — G25.81 RLS (RESTLESS LEGS SYNDROME): Primary | ICD-10-CM

## 2019-01-29 RX ORDER — LORAZEPAM 1 MG/1
1 TABLET ORAL
Qty: 30 TAB | Refills: 0 | Status: SHIPPED | OUTPATIENT
Start: 2019-01-29

## 2019-01-29 RX ORDER — AZITHROMYCIN 250 MG/1
250 TABLET, FILM COATED ORAL SEE ADMIN INSTRUCTIONS
Qty: 6 TAB | Refills: 0 | Status: SHIPPED | OUTPATIENT
Start: 2019-01-29 | End: 2019-02-03

## 2019-01-29 NOTE — PROGRESS NOTES
Chief Complaint Patient presents with  Cough  
  chest congestion(  green in color)  Nasal Congestion  
  x 3 days  Sinus Pain  
  pain mouth and teeth  Medication Refill Ativan

## 2019-12-06 PROBLEM — C50.912 BREAST CANCER METASTASIZED TO AXILLARY LYMPH NODE, LEFT (HCC): Status: ACTIVE | Noted: 2019-11-18

## 2019-12-06 PROBLEM — C77.3 BREAST CANCER METASTASIZED TO AXILLARY LYMPH NODE, LEFT (HCC): Status: ACTIVE | Noted: 2019-11-18
